# Patient Record
Sex: MALE | Race: BLACK OR AFRICAN AMERICAN | NOT HISPANIC OR LATINO | URBAN - METROPOLITAN AREA
[De-identification: names, ages, dates, MRNs, and addresses within clinical notes are randomized per-mention and may not be internally consistent; named-entity substitution may affect disease eponyms.]

---

## 2020-07-25 ENCOUNTER — TELEPHONE ENCOUNTER (OUTPATIENT)
Dept: URBAN - METROPOLITAN AREA CLINIC 13 | Facility: CLINIC | Age: 75
End: 2020-07-25

## 2020-07-25 RX ORDER — POLYETHYLENE GLYCOL 3350, SODIUM CHLORIDE, SODIUM BICARBONATE AND POTASSIUM CHLORIDE WITH LEMON FLAVOR 420; 11.2; 5.72; 1.48 G/4L; G/4L; G/4L; G/4L
USE AS DIRECTED POWDER, FOR SOLUTION ORAL
Qty: 1 | Refills: 0 | OUTPATIENT
Start: 2012-09-07 | End: 2012-09-20

## 2020-07-25 RX ORDER — FERROUS SULFATE 325(65) MG
TAKE 1 TABLET DAILY TABLET ORAL
Refills: 0 | OUTPATIENT
End: 2017-08-03

## 2020-07-25 RX ORDER — MESALAMINE 500 MG/1
TAKE 2 CAPSULE TWICE DAILY CAPSULE ORAL
Qty: 120 | Refills: 11 | OUTPATIENT
Start: 2017-09-21 | End: 2017-09-22

## 2020-07-25 RX ORDER — AMOXICILLIN 500 MG/1
TAKE 2 TABLET TWICE DAILY TABLET, FILM COATED ORAL
Qty: 20 | Refills: 0 | OUTPATIENT
Start: 2012-12-10 | End: 2013-04-16

## 2020-07-25 RX ORDER — METRONIDAZOLE 500 MG/1
TAKE 1 TABLET TWICE DAILY TABLET ORAL
Qty: 10 | Refills: 0 | OUTPATIENT
Start: 2012-12-10 | End: 2013-04-16

## 2020-07-25 RX ORDER — MESALAMINE 800 MG/1
TAKE 1 TABLET TWICE DAILY TABLET, DELAYED RELEASE ORAL
Qty: 60 | Refills: 11 | OUTPATIENT
Start: 2012-12-10 | End: 2013-04-16

## 2020-07-25 RX ORDER — POLYETHYLENE GLYCOL 3350, SODIUM CHLORIDE, SODIUM BICARBONATE AND POTASSIUM CHLORIDE WITH LEMON FLAVOR 420; 11.2; 5.72; 1.48 G/4L; G/4L; G/4L; G/4L
TAKE AS DIRECTED POWDER, FOR SOLUTION ORAL
Qty: 1 | Refills: 0 | OUTPATIENT
Start: 2017-08-03 | End: 2017-09-21

## 2020-07-25 RX ORDER — GLUC/MSM/COLGN2/HYAL/ANTIARTH3 375-375-20
TAKE 1 TABLET DAILY TABLET ORAL
Refills: 0 | OUTPATIENT
End: 2012-09-20

## 2020-07-25 RX ORDER — POLYETHYLENE GLYCOL 3350, SODIUM CHLORIDE, SODIUM BICARBONATE AND POTASSIUM CHLORIDE WITH LEMON FLAVOR 420; 11.2; 5.72; 1.48 G/4L; G/4L; G/4L; G/4L
TAKE AS DIRECTED POWDER, FOR SOLUTION ORAL
Qty: 1 | Refills: 0 | OUTPATIENT
Start: 2015-12-22 | End: 2017-08-03

## 2020-07-25 RX ORDER — PANTOPRAZOLE SODIUM 40 MG/1
TAKE 1 TABLET TWICE DAILY TABLET, DELAYED RELEASE ORAL
Qty: 20 | Refills: 0 | OUTPATIENT
Start: 2012-12-10 | End: 2013-04-16

## 2020-07-25 RX ORDER — CLARITHROMYCIN 500 MG/1
TAKE 1 TABLET TWICE DAILY TABLET, FILM COATED ORAL
Qty: 10 | Refills: 0 | OUTPATIENT
Start: 2012-12-10 | End: 2013-04-16

## 2020-07-26 ENCOUNTER — TELEPHONE ENCOUNTER (OUTPATIENT)
Dept: URBAN - METROPOLITAN AREA CLINIC 13 | Facility: CLINIC | Age: 75
End: 2020-07-26

## 2020-07-26 RX ORDER — MESALAMINE 375 MG/1
TAKE 2 CAPSULES BY MOUTH EVERY 12 HOURS CAPSULE, EXTENDED RELEASE ORAL
Qty: 60 | Refills: 11 | Status: ACTIVE | COMMUNITY
Start: 2017-09-22

## 2020-07-26 RX ORDER — OMEGA-3/DHA/EPA/FISH OIL 35-113.5MG
TAKE 1 TABLET DAILY AS DIRECTED TABLET,CHEWABLE ORAL
Refills: 0 | Status: ACTIVE | COMMUNITY

## 2020-07-26 RX ORDER — AMOXICILLIN 500 MG/1
CAPSULE ORAL
Refills: 0 | Status: ACTIVE | COMMUNITY
Start: 2012-03-20

## 2022-04-15 ENCOUNTER — TELEPHONE ENCOUNTER (OUTPATIENT)
Dept: URBAN - METROPOLITAN AREA CLINIC 72 | Facility: CLINIC | Age: 77
End: 2022-04-15

## 2022-12-01 ENCOUNTER — LAB OUTSIDE AN ENCOUNTER (OUTPATIENT)
Dept: URBAN - METROPOLITAN AREA CLINIC 113 | Facility: CLINIC | Age: 77
End: 2022-12-01

## 2022-12-01 ENCOUNTER — OFFICE VISIT (OUTPATIENT)
Dept: URBAN - METROPOLITAN AREA CLINIC 113 | Facility: CLINIC | Age: 77
End: 2022-12-01
Payer: MEDICARE

## 2022-12-01 ENCOUNTER — WEB ENCOUNTER (OUTPATIENT)
Dept: URBAN - METROPOLITAN AREA CLINIC 113 | Facility: CLINIC | Age: 77
End: 2022-12-01

## 2022-12-01 VITALS
TEMPERATURE: 99.4 F | HEART RATE: 73 BPM | HEIGHT: 68 IN | WEIGHT: 161 LBS | SYSTOLIC BLOOD PRESSURE: 155 MMHG | BODY MASS INDEX: 24.4 KG/M2 | RESPIRATION RATE: 20 BRPM | DIASTOLIC BLOOD PRESSURE: 80 MMHG

## 2022-12-01 DIAGNOSIS — D13.2 DUODENAL ADENOMA: ICD-10-CM

## 2022-12-01 DIAGNOSIS — K50.80 CROHN'S COLITIS: ICD-10-CM

## 2022-12-01 PROCEDURE — 99214 OFFICE O/P EST MOD 30 MIN: CPT | Performed by: INTERNAL MEDICINE

## 2022-12-01 RX ORDER — TAMSULOSIN HYDROCHLORIDE 0.4 MG/1
1 CAPSULE CAPSULE ORAL ONCE A DAY
Status: ACTIVE | COMMUNITY

## 2022-12-01 RX ORDER — OMEGA-3/DHA/EPA/FISH OIL 35-113.5MG
TAKE 1 TABLET DAILY AS DIRECTED TABLET,CHEWABLE ORAL
Refills: 0 | Status: DISCONTINUED | COMMUNITY

## 2022-12-01 RX ORDER — AMOXICILLIN 500 MG/1
CAPSULE ORAL
Refills: 0 | Status: DISCONTINUED | COMMUNITY
Start: 2012-03-20

## 2022-12-01 RX ORDER — MESALAMINE 375 MG/1
TAKE 2 CAPSULES BY MOUTH EVERY 12 HOURS CAPSULE, EXTENDED RELEASE ORAL
Qty: 60 | Refills: 11 | Status: DISCONTINUED | COMMUNITY
Start: 2017-09-22

## 2022-12-01 NOTE — HPI-TODAY'S VISIT:
Very pleasant 77-year-old man presents for office follow-up. . He is currently without any GI symptoms.  He does not have any issues with dysphagia heartburn or regurgitation.  No abdominal pain.  No diarrhea or constipation.  No rectal bleeding or melena.  His weight is stable.  Appetite is fine.  He comes to us today because he is due for surveillance colonoscopy. . Labs March 2022.  LFTs normal.  Creatinine 1.1.  Hemoglobin 13.1.  Platelets 224,000 . Upper endoscopy March 2018.  Mild nonerosive antral gastritis.  Biopsies were negative for H. pylori. . Colonoscopy 2017.  Moderate Crohn's disease limited to the ileocecal valve orifice, cecum, and ascending colon.  No polyps identified.  Terminal ileum was normal.  Biopsies were notable for patchy chronic active colitis consistent with Crohn's disease in the cecum and ascending colon.  Transverse, descending, sigmoid, and rectal biopsies were normal.  Discontinuous ulcers were seen on the IC valve and in the ascending colon. . Colonoscopy 2013.  Scattered erosions in the cecum and ascending colon consistent with mild Crohn's disease.  Scattered small terminal ileal erosions.  Biopsies were consistent with chronic active colitis in the ascending colon and transverse colon.  Random biopsies of the descending colon, rectum and sigmoid were unremarkable.  Terminal ileal biopsies revealed mild inflammation. . Colonoscopy 2012.  Mild right colonic Crohn's disease.  Scattered terminal ileal erosions.  Biopsies were notable for mild to moderate chronic active colitis in the ascending colon and ascending colon.  Terminal ileal biopsies were notable for mild chronic active ileitis with surface erosion. . EGD 2012.  3 mm D2 polyp.  5 mm antral polyp.  Duodenal biopsy was notable for tubular adenoma.  H. pylori was notable on gastric antral biopsies.

## 2022-12-02 PROBLEM — 92385005 BENIGN NEOPLASM OF SMALL INTESTINE: Status: ACTIVE | Noted: 2022-12-01

## 2022-12-23 ENCOUNTER — CLAIMS CREATED FROM THE CLAIM WINDOW (OUTPATIENT)
Dept: URBAN - METROPOLITAN AREA SURGERY CENTER 25 | Facility: SURGERY CENTER | Age: 77
End: 2022-12-23
Payer: MEDICARE

## 2022-12-23 ENCOUNTER — CLAIMS CREATED FROM THE CLAIM WINDOW (OUTPATIENT)
Dept: URBAN - METROPOLITAN AREA SURGERY CENTER 25 | Facility: SURGERY CENTER | Age: 77
End: 2022-12-23

## 2022-12-23 ENCOUNTER — CLAIMS CREATED FROM THE CLAIM WINDOW (OUTPATIENT)
Dept: URBAN - METROPOLITAN AREA CLINIC 4 | Facility: CLINIC | Age: 77
End: 2022-12-23
Payer: MEDICARE

## 2022-12-23 DIAGNOSIS — Z87.19 PERSONAL HISTORY OF OTHER DISEASES OF THE DIGESTIVE SYSTEM: ICD-10-CM

## 2022-12-23 DIAGNOSIS — Z09 EXAMINATION FOR, FOLLOW-UP: ICD-10-CM

## 2022-12-23 DIAGNOSIS — K31.A0 GASTRIC INTESTINAL METAPLASIA, UNSPECIFIED: ICD-10-CM

## 2022-12-23 DIAGNOSIS — K29.50 CHRONIC GASTRITIS: ICD-10-CM

## 2022-12-23 DIAGNOSIS — Z86.19 HISTORY OF HELICOBACTER PYLORI INFECTION: ICD-10-CM

## 2022-12-23 PROCEDURE — G8907 PT DOC NO EVENTS ON DISCHARG: HCPCS | Performed by: INTERNAL MEDICINE

## 2022-12-23 PROCEDURE — 43239 EGD BIOPSY SINGLE/MULTIPLE: CPT | Performed by: INTERNAL MEDICINE

## 2022-12-23 PROCEDURE — 88305 TISSUE EXAM BY PATHOLOGIST: CPT | Performed by: PATHOLOGY

## 2022-12-23 PROCEDURE — 88342 IMHCHEM/IMCYTCHM 1ST ANTB: CPT | Performed by: PATHOLOGY

## 2022-12-23 RX ORDER — TAMSULOSIN HYDROCHLORIDE 0.4 MG/1
1 CAPSULE CAPSULE ORAL ONCE A DAY
Status: ACTIVE | COMMUNITY

## 2023-01-05 ENCOUNTER — TELEPHONE ENCOUNTER (OUTPATIENT)
Dept: URBAN - METROPOLITAN AREA CLINIC 113 | Facility: CLINIC | Age: 78
End: 2023-01-05

## 2023-01-05 ENCOUNTER — CLAIMS CREATED FROM THE CLAIM WINDOW (OUTPATIENT)
Dept: URBAN - METROPOLITAN AREA CLINIC 4 | Facility: CLINIC | Age: 78
End: 2023-01-05
Payer: MEDICARE

## 2023-01-05 ENCOUNTER — OFFICE VISIT (OUTPATIENT)
Dept: URBAN - METROPOLITAN AREA SURGERY CENTER 25 | Facility: SURGERY CENTER | Age: 78
End: 2023-01-05
Payer: MEDICARE

## 2023-01-05 ENCOUNTER — LAB OUTSIDE AN ENCOUNTER (OUTPATIENT)
Dept: URBAN - METROPOLITAN AREA CLINIC 113 | Facility: CLINIC | Age: 78
End: 2023-01-05

## 2023-01-05 DIAGNOSIS — Z09 EXAMINATION FOR, FOLLOW-UP: ICD-10-CM

## 2023-01-05 DIAGNOSIS — K50.80 CROHN'S DISEASE OF BOTH SMALL AND LARGE INTESTINE: ICD-10-CM

## 2023-01-05 DIAGNOSIS — K50.00 CROHN'S DISEASE OF SMALL INTESTINE WITHOUT COMPLICATIONS: ICD-10-CM

## 2023-01-05 PROCEDURE — 88305 TISSUE EXAM BY PATHOLOGIST: CPT | Performed by: PATHOLOGY

## 2023-01-05 PROCEDURE — 45380 COLONOSCOPY AND BIOPSY: CPT | Performed by: INTERNAL MEDICINE

## 2023-01-05 PROCEDURE — G8907 PT DOC NO EVENTS ON DISCHARG: HCPCS | Performed by: INTERNAL MEDICINE

## 2023-01-05 RX ORDER — PREDNISONE 10 MG/1
2 TABLETS TABLET ORAL ONCE A DAY
Qty: 60 | Refills: 2 | OUTPATIENT

## 2023-01-05 RX ORDER — TAMSULOSIN HYDROCHLORIDE 0.4 MG/1
1 CAPSULE CAPSULE ORAL ONCE A DAY
Status: ACTIVE | COMMUNITY

## 2023-01-19 PROBLEM — 8493009 CHRONIC GASTRITIS: Status: ACTIVE | Noted: 2023-01-19

## 2023-01-25 PROBLEM — 71833008 CROHN'S DISEASE OF SMALL AND LARGE INTESTINES: Status: ACTIVE | Noted: 2023-01-25

## 2023-01-30 ENCOUNTER — OFFICE VISIT (OUTPATIENT)
Dept: URBAN - METROPOLITAN AREA CLINIC 113 | Facility: CLINIC | Age: 78
End: 2023-01-30
Payer: MEDICARE

## 2023-01-30 ENCOUNTER — DASHBOARD ENCOUNTERS (OUTPATIENT)
Age: 78
End: 2023-01-30

## 2023-01-30 VITALS
SYSTOLIC BLOOD PRESSURE: 166 MMHG | RESPIRATION RATE: 20 BRPM | DIASTOLIC BLOOD PRESSURE: 72 MMHG | BODY MASS INDEX: 24.92 KG/M2 | HEIGHT: 68 IN | TEMPERATURE: 98 F | WEIGHT: 164.4 LBS | HEART RATE: 74 BPM

## 2023-01-30 DIAGNOSIS — K50.80 CROHN'S COLITIS: ICD-10-CM

## 2023-01-30 PROCEDURE — 99214 OFFICE O/P EST MOD 30 MIN: CPT | Performed by: NURSE PRACTITIONER

## 2023-01-30 RX ORDER — TAMSULOSIN HYDROCHLORIDE 0.4 MG/1
1 CAPSULE CAPSULE ORAL ONCE A DAY
Status: ACTIVE | COMMUNITY

## 2023-01-30 RX ORDER — PREDNISONE 10 MG/1
2 TABLETS TABLET ORAL ONCE A DAY
Qty: 60 | Refills: 2 | Status: ACTIVE | COMMUNITY

## 2023-01-30 NOTE — HPI-TODAY'S VISIT:
79yo male with Crohn's colitis presenting for follow-up after EGD and colonoscopy. He was seen in the office in December for follow-up regarding Crohn's colitis, clinically asymptomatic off treatment.  A colonoscopy was planned for disease reassessment.  An upper endoscopy was also recommended for surveillance regarding his history of adenoma. Colonoscopy 1/5/2023:Moderate Crohn's colitis most notable on the ICV, ascending, and cecum; mildly worse than prior colonoscopy.  Moderate ileal Crohn's disease, patulous ICV orifice, normal transverse colon, descending colon, sigmoid colon and rectum status post biopsies, mild external hemorrhoids.  Biopsies of the ascending colon, terminal ileum and cecum showed chronic active colitis and ileitis consistent with Crohn's disease, negative for dysplasia.  Biopsies from the transverse colon, descending colon, and rectosigmoid colon were unremarkable.  He was started on a prednisone taper with consideration for initiation of Humira.  Routine labs and a CT of the abdomen and pelvis were planned.  recommended repeat colonoscopy in 6 months to assess response to therapy. EGD 12/23/2022:Small 1 cm hiatal hernia, normal antrum status post biopsies, normal duodenum.  Gastric biopsy showed chronic inactive gastritis with histological changes suggestive of treated H. pylori, no evidence for H. pylori organisms or intestinal metaplasia. He has not had the bloodwork or CT scan performed. He remains asymptomatic. No abdominal pain, nausea or vomiting. His bowel habits are regular without blood per rectum. No diarrhea, urgency or nocturnal defecation. He is nearing completion of the prednisone taper.

## 2023-01-30 NOTE — HPI-OTHER HISTORIES
Labs March 2022.  LFTs normal.  Creatinine 1.1.  Hemoglobin 13.1.  Platelets 224,000 Upper endoscopy March 2018.  Mild nonerosive antral gastritis.  Biopsies were negative for H. pylori. Colonoscopy 2017.  Moderate Crohn's disease limited to the ileocecal valve orifice, cecum, and ascending colon.  No polyps identified.  Terminal ileum was normal.  Biopsies were notable for patchy chronic active colitis consistent with Crohn's disease in the cecum and ascending colon.  Transverse, descending, sigmoid, and rectal biopsies were normal.  Discontinuous ulcers were seen on the IC valve and in the ascending colon. Colonoscopy 2013.  Scattered erosions in the cecum and ascending colon consistent with mild Crohn's disease.  Scattered small terminal ileal erosions.  Biopsies were consistent with chronic active colitis in the ascending colon and transverse colon.  Random biopsies of the descending colon, rectum and sigmoid were unremarkable.  Terminal ileal biopsies revealed mild inflammation. Colonoscopy 2012.  Mild right colonic Crohn's disease.  Scattered terminal ileal erosions.  Biopsies were notable for mild to moderate chronic active colitis in the ascending colon and ascending colon.  Terminal ileal biopsies were notable for mild chronic active ileitis with surface erosion. EGD 2012.  3 mm D2 polyp.  5 mm antral polyp.  Duodenal biopsy was notable for tubular adenoma.  H. pylori was notable on gastric antral biopsies.

## 2023-01-31 PROBLEM — 71833008 CROHN'S DISEASE OF SMALL AND LARGE INTESTINES: Status: ACTIVE | Noted: 2022-12-01

## 2023-02-03 LAB
A/G RATIO: 1.4
ALBUMIN: 3.9
ALKALINE PHOSPHATASE: 62
ALT (SGPT): 20
AST (SGOT): 13
BILIRUBIN, TOTAL: 0.6
BUN/CREATININE RATIO: (no result)
BUN: 15
C-REACTIVE PROTEIN, QUANT: 1.6
CALCIUM: 9.3
CARBON DIOXIDE, TOTAL: 29
CHLORIDE: 104
CREATININE: 1.1
EGFR: 68
GLOBULIN, TOTAL: 2.8
GLUCOSE: 113
HBSAG SCREEN: (no result)
HEMATOCRIT: 35.7
HEMOGLOBIN: 11.5
HEP A AB, IGM: (no result)
HEP B CORE AB, IGM: (no result)
HEP C VIRUS AB: <0.02
HEPATITIS C ANTIBODY: (no result)
MCH: 27.8
MCHC: 32.2
MCV: 86.2
MITOGEN-NIL: >10
MPV: 11
PLATELET COUNT: 211
POTASSIUM: 5
PROTEIN, TOTAL: 6.7
QUANTIFERON NIL VALUE: 0.05
QUANTIFERON TB1 AG VALUE: 0
QUANTIFERON TB2 AG VALUE: 0
QUANTIFERON-TB GOLD PLUS: NEGATIVE
RDW: 14.5
RED BLOOD CELL COUNT: 4.14
SED RATE BY MODIFIED: 9
SODIUM: 138
WHITE BLOOD CELL COUNT: 5.1

## 2023-02-20 ENCOUNTER — TELEPHONE ENCOUNTER (OUTPATIENT)
Dept: URBAN - METROPOLITAN AREA CLINIC 113 | Facility: CLINIC | Age: 78
End: 2023-02-20

## 2023-02-20 ENCOUNTER — LAB OUTSIDE AN ENCOUNTER (OUTPATIENT)
Dept: URBAN - METROPOLITAN AREA CLINIC 113 | Facility: CLINIC | Age: 78
End: 2023-02-20

## 2023-02-20 PROBLEM — 15634181000119107 CT OF ABDOMEN ABNORMAL: Status: ACTIVE | Noted: 2023-02-20

## 2023-02-20 PROBLEM — 235493004 DILATATION OF PANCREATIC DUCT: Status: ACTIVE | Noted: 2023-02-20

## 2024-12-30 ENCOUNTER — OFFICE VISIT (OUTPATIENT)
Dept: URBAN - METROPOLITAN AREA CLINIC 113 | Facility: CLINIC | Age: 79
End: 2024-12-30

## 2024-12-30 ENCOUNTER — LAB OUTSIDE AN ENCOUNTER (OUTPATIENT)
Dept: URBAN - METROPOLITAN AREA CLINIC 113 | Facility: CLINIC | Age: 79
End: 2024-12-30

## 2024-12-30 VITALS
HEART RATE: 62 BPM | WEIGHT: 165 LBS | RESPIRATION RATE: 18 BRPM | HEIGHT: 68 IN | SYSTOLIC BLOOD PRESSURE: 158 MMHG | TEMPERATURE: 98.8 F | DIASTOLIC BLOOD PRESSURE: 73 MMHG | BODY MASS INDEX: 25.01 KG/M2

## 2024-12-30 RX ORDER — FERROUS SULFATE 325(65) MG
1 TABLET TABLET ORAL
Qty: 12 | Status: ACTIVE | COMMUNITY
Start: 2024-12-30

## 2024-12-30 RX ORDER — TAMSULOSIN HYDROCHLORIDE 0.4 MG/1
1 CAPSULE CAPSULE ORAL ONCE A DAY
Status: ACTIVE | COMMUNITY

## 2024-12-30 NOTE — HPI-TODAY'S VISIT:
77 yo male with Crohn's colitis presenting for follow-up after EGD and colonoscopy.  He was seen in the office in December for follow-up regarding Crohn's colitis, clinically asymptomatic off treatment.  A colonoscopy was planned for disease reassessment.  An upper endoscopy was also recommended for surveillance regarding his history of adenoma.  Colonoscopy 1/5/2023:Moderate Crohn's colitis most notable on the ICV, ascending, and cecum; mildly worse than prior colonoscopy.  Moderate ileal Crohn's disease, patulous ICV orifice, normal transverse colon, descending colon, sigmoid colon and rectum status post biopsies, mild external hemorrhoids.  Biopsies of the ascending colon, terminal ileum and cecum showed chronic active colitis and ileitis consistent with Crohn's disease, negative for dysplasia.  Biopsies from the transverse colon, descending colon, and rectosigmoid colon were unremarkable.  He was started on a prednisone taper with consideration for initiation of Humira.  Routine labs and a CT of the abdomen and pelvis were planned.  recommended repeat colonoscopy in 6 months to assess response to therapy.  EGD 12/23/2022:Small 1 cm hiatal hernia, normal antrum status post biopsies, normal duodenum.  Gastric biopsy showed chronic inactive gastritis with histological changes suggestive of treated H. pylori, no evidence for H. pylori organisms or intestinal metaplasia. Labs.  January 2023.  Sed rate 9, CRP 1.6, QuantiFERON-TB testing negative, creatinine 1.1, AST 13, ALT 20, hemoglobin 11.5, platelet count 211K, hepatitis serology negative. CT scan abdomen pelvis.  With contrast.  February 2023.  Mild distal terminal ileal thickening and mild thickening of the proximal ascending colon.  Mild pancreatic duct dilation.  Distended bladder noted.  Of note, he is followed by urology because of a history of benign prostatic hypertrophy. He is without significant symptoms.  He did not want to start biologic therapy for his Crohn's disease.  He is not having diarrhea or constipation.  No rectal bleeding or melena.  Weight is stable and appetite is fine.  No dysphagia heartburn or regurgitation.  No complaints of significant abdominal pain.

## 2025-01-10 ENCOUNTER — OFFICE VISIT (OUTPATIENT)
Dept: URBAN - METROPOLITAN AREA SURGERY CENTER 25 | Facility: SURGERY CENTER | Age: 80
End: 2025-01-10
Payer: MEDICARE

## 2025-01-10 ENCOUNTER — CLAIMS CREATED FROM THE CLAIM WINDOW (OUTPATIENT)
Dept: URBAN - METROPOLITAN AREA CLINIC 4 | Facility: CLINIC | Age: 80
End: 2025-01-10
Payer: MEDICARE

## 2025-01-10 DIAGNOSIS — K50.119: ICD-10-CM

## 2025-01-10 DIAGNOSIS — K63.89 APPENDICITIS EPIPLOICA: ICD-10-CM

## 2025-01-10 DIAGNOSIS — K50.119 CROHN'S DISEASE OF LARGE INTESTINE WITH UNSPECIFIED COMPLICATIONS: ICD-10-CM

## 2025-01-10 DIAGNOSIS — K63.89 OTHER SPECIFIED DISEASES OF INTESTINE: ICD-10-CM

## 2025-01-10 DIAGNOSIS — K50.80 CROHN'S DISEASE OF BOTH SMALL AND LARGE INTESTINE: ICD-10-CM

## 2025-01-10 PROCEDURE — 88305 TISSUE EXAM BY PATHOLOGIST: CPT | Performed by: PATHOLOGY

## 2025-01-10 PROCEDURE — 45380 COLONOSCOPY AND BIOPSY: CPT | Performed by: CLINIC/CENTER

## 2025-01-10 PROCEDURE — 45380 COLONOSCOPY AND BIOPSY: CPT | Performed by: INTERNAL MEDICINE

## 2025-01-10 PROCEDURE — 00812 ANES LWR INTST SCR COLSC: CPT | Performed by: NURSE ANESTHETIST, CERTIFIED REGISTERED

## 2025-01-10 RX ORDER — TAMSULOSIN HYDROCHLORIDE 0.4 MG/1
1 CAPSULE CAPSULE ORAL ONCE A DAY
Status: ACTIVE | COMMUNITY

## 2025-01-10 RX ORDER — FERROUS SULFATE 325(65) MG
1 TABLET TABLET ORAL
Qty: 12 | Status: ACTIVE | COMMUNITY
Start: 2024-12-30